# Patient Record
Sex: MALE | Race: WHITE | NOT HISPANIC OR LATINO | URBAN - METROPOLITAN AREA
[De-identification: names, ages, dates, MRNs, and addresses within clinical notes are randomized per-mention and may not be internally consistent; named-entity substitution may affect disease eponyms.]

---

## 2018-05-21 ENCOUNTER — OUTPATIENT (OUTPATIENT)
Dept: OUTPATIENT SERVICES | Facility: HOSPITAL | Age: 83
LOS: 1 days | End: 2018-05-21
Payer: COMMERCIAL

## 2018-05-21 DIAGNOSIS — H26.491 OTHER SECONDARY CATARACT, RIGHT EYE: ICD-10-CM

## 2018-05-21 PROCEDURE — 66821 AFTER CATARACT LASER SURGERY: CPT | Mod: RT

## 2019-07-26 PROBLEM — Z00.00 ENCOUNTER FOR PREVENTIVE HEALTH EXAMINATION: Status: ACTIVE | Noted: 2019-07-26

## 2019-07-29 ENCOUNTER — APPOINTMENT (OUTPATIENT)
Dept: SURGERY | Facility: CLINIC | Age: 84
End: 2019-07-29
Payer: MEDICARE

## 2019-07-29 VITALS
SYSTOLIC BLOOD PRESSURE: 168 MMHG | HEART RATE: 82 BPM | TEMPERATURE: 98.4 F | HEIGHT: 66.5 IN | DIASTOLIC BLOOD PRESSURE: 67 MMHG | WEIGHT: 142 LBS | BODY MASS INDEX: 22.55 KG/M2

## 2019-07-29 DIAGNOSIS — Z82.49 FAMILY HISTORY OF ISCHEMIC HEART DISEASE AND OTHER DISEASES OF THE CIRCULATORY SYSTEM: ICD-10-CM

## 2019-07-29 DIAGNOSIS — Z86.79 PERSONAL HISTORY OF OTHER DISEASES OF THE CIRCULATORY SYSTEM: ICD-10-CM

## 2019-07-29 DIAGNOSIS — L08.9 SEBACEOUS CYST: ICD-10-CM

## 2019-07-29 DIAGNOSIS — L72.3 SEBACEOUS CYST: ICD-10-CM

## 2019-07-29 DIAGNOSIS — Z78.9 OTHER SPECIFIED HEALTH STATUS: ICD-10-CM

## 2019-07-29 DIAGNOSIS — Z87.891 PERSONAL HISTORY OF NICOTINE DEPENDENCE: ICD-10-CM

## 2019-07-29 DIAGNOSIS — Z86.39 PERSONAL HISTORY OF OTHER ENDOCRINE, NUTRITIONAL AND METABOLIC DISEASE: ICD-10-CM

## 2019-07-29 DIAGNOSIS — Z85.46 PERSONAL HISTORY OF MALIGNANT NEOPLASM OF PROSTATE: ICD-10-CM

## 2019-07-29 PROCEDURE — 10061 I&D ABSCESS COMP/MULTIPLE: CPT

## 2019-07-29 PROCEDURE — 99202 OFFICE O/P NEW SF 15 MIN: CPT | Mod: 25

## 2019-07-29 RX ORDER — CHOLECALCIFEROL (VITAMIN D3) 25 MCG
TABLET ORAL
Refills: 0 | Status: ACTIVE | COMMUNITY

## 2019-07-29 RX ORDER — CRANBERRY FRUIT EXTRACT 650 MG
CAPSULE ORAL
Refills: 0 | Status: ACTIVE | COMMUNITY

## 2019-07-29 RX ORDER — GLUC/MSM/COLGN2/HYAL/ANTIARTH3 375-375-20
TABLET ORAL
Refills: 0 | Status: ACTIVE | COMMUNITY

## 2019-07-29 RX ORDER — QUINAPRIL HYDROCHLORIDE 5 MG/1
TABLET, FILM COATED ORAL
Refills: 0 | Status: ACTIVE | COMMUNITY

## 2019-07-29 RX ORDER — WARFARIN 4 MG/1
TABLET ORAL
Refills: 0 | Status: ACTIVE | COMMUNITY

## 2019-07-29 RX ORDER — PRASUGREL 5 MG/1
5 TABLET, FILM COATED ORAL
Refills: 0 | Status: ACTIVE | COMMUNITY

## 2019-07-29 RX ORDER — ATENOLOL 100 MG/1
100 TABLET ORAL
Refills: 0 | Status: ACTIVE | COMMUNITY

## 2019-07-29 RX ORDER — ATORVASTATIN CALCIUM 20 MG/1
20 TABLET, FILM COATED ORAL
Refills: 0 | Status: ACTIVE | COMMUNITY

## 2019-07-29 RX ORDER — PSYLLIUM HUSK 0.4 G
CAPSULE ORAL
Refills: 0 | Status: ACTIVE | COMMUNITY

## 2019-07-29 RX ORDER — CHLORTHALIDONE 50 MG/1
TABLET ORAL
Refills: 0 | Status: ACTIVE | COMMUNITY

## 2019-07-29 RX ORDER — ASCORBIC ACID 500 MG
TABLET ORAL
Refills: 0 | Status: ACTIVE | COMMUNITY

## 2019-07-31 NOTE — HISTORY OF PRESENT ILLNESS
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 He denies any fevers or chills. He states Pain when sitting against his back.

## 2019-07-31 NOTE — ASSESSMENT
[FreeTextEntry1] : 83 yo male with an infected sebaceous cyst which was I&D'd .\par \par wound packed. to remove packing tomorrow.\par \par \par f/u prn

## 2019-07-31 NOTE — PHYSICAL EXAM
[Respiratory Effort] : normal respiratory effort [Alert] : alert [Oriented to Person] : oriented to person [Oriented to Place] : oriented to place [Oriented to Time] : oriented to time [Calm] : calm [de-identified] : EOMI [de-identified] : KRISTIN GRIGSBY NAD [JVD] : no jugular venous distention  [de-identified] : + infected cyst of back, +tender and draining

## 2019-11-14 ENCOUNTER — APPOINTMENT (OUTPATIENT)
Dept: PLASTIC SURGERY | Facility: CLINIC | Age: 84
End: 2019-11-14
Payer: MEDICARE

## 2019-11-14 VITALS
BODY MASS INDEX: 22.87 KG/M2 | RESPIRATION RATE: 18 BRPM | OXYGEN SATURATION: 99 % | SYSTOLIC BLOOD PRESSURE: 154 MMHG | TEMPERATURE: 98 F | HEIGHT: 66.5 IN | HEART RATE: 61 BPM | WEIGHT: 144 LBS | DIASTOLIC BLOOD PRESSURE: 63 MMHG

## 2019-11-14 PROCEDURE — 99203 OFFICE O/P NEW LOW 30 MIN: CPT

## 2019-11-14 NOTE — PHYSICAL EXAM
[de-identified] : Left cheek brown pigmented lesion, irregular borders, non-ulcerated, 2cm x 1.5 cm. [NI] : Normal

## 2019-11-14 NOTE — HISTORY OF PRESENT ILLNESS
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  The patient is pre-op for left cheek excision by Dr. Dennis and he is here to plan for reconstruction.  He is a non-smoker.  He has no personal history of blood clots.  He is on coumadin for a mechanical valve, and he had coronary stents placed last year.

## 2020-01-10 RX ORDER — OXYCODONE 5 MG/1
5 TABLET ORAL EVERY 6 HOURS
Qty: 10 | Refills: 0 | Status: ACTIVE | COMMUNITY
Start: 2020-01-10 | End: 1900-01-01

## 2020-01-28 ENCOUNTER — APPOINTMENT (OUTPATIENT)
Dept: PLASTIC SURGERY | Facility: CLINIC | Age: 85
End: 2020-01-28
Payer: MEDICARE

## 2020-01-28 VITALS
RESPIRATION RATE: 18 BRPM | SYSTOLIC BLOOD PRESSURE: 183 MMHG | DIASTOLIC BLOOD PRESSURE: 73 MMHG | HEART RATE: 46 BPM | OXYGEN SATURATION: 100 %

## 2020-01-28 PROCEDURE — 99024 POSTOP FOLLOW-UP VISIT: CPT

## 2020-01-28 NOTE — HISTORY OF PRESENT ILLNESS
[FreeTextEntry1] : Mr. KELLIE MARTE is a 85 year old male with past medical history of left cheek melanoma who presents now s/p closure of Mohs defect 1/17/20. Pt doing well\par States he is taking a blood thinner and noticed some bruising on his neck and chest  \par denies fever, chills, pain, redness, swelling

## 2020-01-28 NOTE — ASSESSMENT
[FreeTextEntry1] : 84 yo male s/p Mohs closure right cheek\par steri strips removed today as they were falling off \par discussed to shower like normal\par monitor for increased swelling, bleeding, pain, fever, chills, redness, drainage

## 2020-01-28 NOTE — PHYSICAL EXAM
[NI] : Normal [de-identified] : healing ecchymosis to neck, no fluid collections or signs of expanding hematoma  [de-identified] : left cheek incision c/d/i\par no palpable fluid collections or signs of infection \par healing ecchymosis from left neck to left upper chest

## 2020-02-04 ENCOUNTER — APPOINTMENT (OUTPATIENT)
Dept: PLASTIC SURGERY | Facility: CLINIC | Age: 85
End: 2020-02-04
Payer: MEDICARE

## 2020-02-04 VITALS
TEMPERATURE: 97.3 F | SYSTOLIC BLOOD PRESSURE: 151 MMHG | DIASTOLIC BLOOD PRESSURE: 78 MMHG | RESPIRATION RATE: 16 BRPM | OXYGEN SATURATION: 98 % | BODY MASS INDEX: 23.51 KG/M2 | WEIGHT: 148.03 LBS | HEIGHT: 66.5 IN | HEART RATE: 63 BPM

## 2020-02-04 VITALS — SYSTOLIC BLOOD PRESSURE: 156 MMHG | DIASTOLIC BLOOD PRESSURE: 69 MMHG

## 2020-02-04 PROCEDURE — 99024 POSTOP FOLLOW-UP VISIT: CPT

## 2020-02-04 NOTE — PHYSICAL EXAM
[NI] : Normal [de-identified] : left cheek incision c/d/i\par no palpable fluid collections or signs of infection

## 2020-02-04 NOTE — ASSESSMENT
[FreeTextEntry1] : 86 yo male s/p Mohs closure right cheek with rhomboid flap 1/17/20\par pt doing well \par flap healing nicely\par discussed applying sunscreen to area daily as well as daily massages to begin in 2 weeks\par monitor for redness, fever, chills, drainage

## 2020-02-04 NOTE — HISTORY OF PRESENT ILLNESS
[FreeTextEntry1] : Mr. KELLIE MARTE is a 85 year old male with past medical history of left cheek melanoma who presents now s/p closure of Mohs defect 1/17/20. Pt doing well\par states the bruising has improved and is now gone \par denies fever, chills, pain, redness, swelling

## 2020-06-10 ENCOUNTER — APPOINTMENT (OUTPATIENT)
Dept: SURGERY | Facility: CLINIC | Age: 85
End: 2020-06-10
Payer: MEDICARE

## 2020-06-10 VITALS
TEMPERATURE: 98 F | DIASTOLIC BLOOD PRESSURE: 78 MMHG | BODY MASS INDEX: 23.03 KG/M2 | WEIGHT: 145 LBS | HEART RATE: 50 BPM | OXYGEN SATURATION: 97 % | HEIGHT: 66.5 IN | SYSTOLIC BLOOD PRESSURE: 170 MMHG

## 2020-06-10 DIAGNOSIS — Z87.898 PERSONAL HISTORY OF OTHER SPECIFIED CONDITIONS: ICD-10-CM

## 2020-06-10 PROCEDURE — 99212 OFFICE O/P EST SF 10 MIN: CPT

## 2020-07-09 RX ORDER — OXYCODONE 5 MG/1
5 TABLET ORAL
Qty: 10 | Refills: 0 | Status: ACTIVE | COMMUNITY
Start: 2020-07-09 | End: 1900-01-01

## 2020-07-17 ENCOUNTER — APPOINTMENT (OUTPATIENT)
Dept: PLASTIC SURGERY | Facility: AMBULATORY SURGERY CENTER | Age: 85
End: 2020-07-17

## 2020-09-09 RX ORDER — OXYCODONE 5 MG/1
5 TABLET ORAL
Qty: 10 | Refills: 0 | Status: ACTIVE | COMMUNITY
Start: 2020-09-09 | End: 1900-01-01

## 2020-09-18 ENCOUNTER — APPOINTMENT (OUTPATIENT)
Dept: PLASTIC SURGERY | Facility: AMBULATORY SURGERY CENTER | Age: 85
End: 2020-09-18
Payer: MEDICARE

## 2020-09-18 PROCEDURE — 14060 TIS TRNFR E/N/E/L 10 SQ CM/<: CPT

## 2020-09-24 ENCOUNTER — APPOINTMENT (OUTPATIENT)
Dept: PLASTIC SURGERY | Facility: CLINIC | Age: 85
End: 2020-09-24
Payer: MEDICARE

## 2020-09-24 VITALS
OXYGEN SATURATION: 96 % | SYSTOLIC BLOOD PRESSURE: 157 MMHG | TEMPERATURE: 98.1 F | HEART RATE: 66 BPM | WEIGHT: 145 LBS | BODY MASS INDEX: 23.03 KG/M2 | DIASTOLIC BLOOD PRESSURE: 88 MMHG | HEIGHT: 66.5 IN

## 2020-09-24 PROCEDURE — 99024 POSTOP FOLLOW-UP VISIT: CPT

## 2020-10-08 ENCOUNTER — APPOINTMENT (OUTPATIENT)
Dept: PLASTIC SURGERY | Facility: CLINIC | Age: 85
End: 2020-10-08
Payer: MEDICARE

## 2020-10-08 VITALS
DIASTOLIC BLOOD PRESSURE: 79 MMHG | WEIGHT: 145 LBS | TEMPERATURE: 97.6 F | SYSTOLIC BLOOD PRESSURE: 168 MMHG | HEIGHT: 66.5 IN | HEART RATE: 61 BPM | OXYGEN SATURATION: 95 % | BODY MASS INDEX: 23.03 KG/M2

## 2020-10-08 PROCEDURE — 99024 POSTOP FOLLOW-UP VISIT: CPT

## 2020-10-08 NOTE — PHYSICAL EXAM
[NI] : Normal [de-identified] : right helix with incisional dehiscence, fibrin at base, no surrounding signs of infection \par no drainage of odor

## 2020-10-08 NOTE — REASON FOR VISIT
[Post Op: _________] : a [unfilled] post op visit [FreeTextEntry1] : DOS: 09/18/2020 s/p closure of MOHS of right helix with full thickness skin graft and local tissue rearrangement. Pt states he is doing better with time.

## 2020-10-08 NOTE — ASSESSMENT
[FreeTextEntry1] : 86 yo male s/p right helix Mohs closure with local tissue rearrangement 9/18/2020\par pt doing well\par seen and examined with Dr Alston\par discussed daily dressing changes with moist to dry dressings daily after showers\par monitor for redness, swelling, fever, chills\par all pt questions answered\par

## 2020-10-08 NOTE — HISTORY OF PRESENT ILLNESS
[FreeTextEntry1] : Mr. KELLIE MARTE is a 85 year old male with past medical history of right ear helix melanoma who presents now s/p right helix Mohs closure with local tissue rearrangement 9/18/2020\par pt doing ok \par pt has been doing daily dressing changes as instructed \par denies fever, chills \par \par

## 2020-10-12 NOTE — REASON FOR VISIT
[Post Op: _________] : a [unfilled] post op visit [Spouse] : spouse [FreeTextEntry1] : DOS: 09/18/2020 s/p closure of MOHS of right helix with full thickness skin graft and local tissue rearrangement. Pt states he is doing better with time.

## 2020-10-22 ENCOUNTER — APPOINTMENT (OUTPATIENT)
Dept: PLASTIC SURGERY | Facility: CLINIC | Age: 85
End: 2020-10-22
Payer: MEDICARE

## 2020-10-22 VITALS
OXYGEN SATURATION: 96 % | TEMPERATURE: 98.3 F | HEIGHT: 66.5 IN | HEART RATE: 65 BPM | BODY MASS INDEX: 23.03 KG/M2 | DIASTOLIC BLOOD PRESSURE: 95 MMHG | WEIGHT: 145 LBS | SYSTOLIC BLOOD PRESSURE: 185 MMHG

## 2020-10-22 DIAGNOSIS — S01.309S: ICD-10-CM

## 2020-10-22 PROCEDURE — 99024 POSTOP FOLLOW-UP VISIT: CPT

## 2020-11-03 ENCOUNTER — APPOINTMENT (OUTPATIENT)
Dept: PLASTIC SURGERY | Facility: CLINIC | Age: 85
End: 2020-11-03
Payer: MEDICARE

## 2020-11-03 VITALS
TEMPERATURE: 97.3 F | OXYGEN SATURATION: 95 % | WEIGHT: 145 LBS | BODY MASS INDEX: 23.3 KG/M2 | DIASTOLIC BLOOD PRESSURE: 68 MMHG | HEART RATE: 60 BPM | SYSTOLIC BLOOD PRESSURE: 168 MMHG | HEIGHT: 66 IN

## 2020-11-03 DIAGNOSIS — C43.30 MALIGNANT MELANOMA OF UNSPECIFIED PART OF FACE: ICD-10-CM

## 2020-11-03 PROCEDURE — 99024 POSTOP FOLLOW-UP VISIT: CPT

## 2020-11-03 NOTE — ASSESSMENT
[FreeTextEntry1] : 84 yo male s/p right helix Mohs closure 9/18/2020\par pt doing well\par wound healed\par discussed placing a band aid on area after showers

## 2020-11-03 NOTE — PHYSICAL EXAM
[NI] : Normal [de-identified] : right helix incisional wound now healed\par no drainage of odor \par no signs of infection

## 2020-11-19 ENCOUNTER — APPOINTMENT (OUTPATIENT)
Dept: PLASTIC SURGERY | Facility: CLINIC | Age: 85
End: 2020-11-19
Payer: MEDICARE

## 2020-11-19 VITALS
HEIGHT: 66 IN | SYSTOLIC BLOOD PRESSURE: 202 MMHG | BODY MASS INDEX: 23.3 KG/M2 | TEMPERATURE: 97.4 F | WEIGHT: 145 LBS | OXYGEN SATURATION: 95 % | DIASTOLIC BLOOD PRESSURE: 97 MMHG | HEART RATE: 63 BPM

## 2020-11-19 PROCEDURE — 99024 POSTOP FOLLOW-UP VISIT: CPT

## 2020-11-19 NOTE — HISTORY OF PRESENT ILLNESS
[FreeTextEntry1] : Mr. KELLIE MARTE is a 85 year old male with past medical history of right ear helix melanoma who presents now s/p right helix Mohs closure with local tissue rearrangement 9/18/2020\par pt doing ok \par pt has been applying bacitracin\par denies fever, chills \par \par

## 2020-11-19 NOTE — PHYSICAL EXAM
[NI] : Normal [de-identified] : right helix incisional wound now healed\par no drainage of odor \par no signs of infection

## 2020-12-13 PROBLEM — S01.309S: Status: ACTIVE | Noted: 2020-10-12

## 2020-12-13 NOTE — REASON FOR VISIT
[Follow-Up: _____] : a [unfilled] follow-up visit [FreeTextEntry1] : Jonny ear reconstruction with primary closure on 09/18/2020

## 2020-12-13 NOTE — HISTORY OF PRESENT ILLNESS
[FreeTextEntry1] : Mr. Horton is a 85 year old female here today for follow up visit. She is s/p Right ear reconstruction with primary closure on 09/18/2020. He has a past medical history significant for right ear helix melanoma, High Cholesterol, Hypertension and Malignant Neoplasm of prostate.

## 2021-08-06 NOTE — ASSESSMENT
[FreeTextEntry1] : 84 yo male s/p right helix Mohs closure 9/18/2020\par pt seen and examined with Dr Alston\par wound has now healed\par cartilage has skin coverage \par monitor for redness, swelling, fever, chills\par all pt questions answered\par 
chest pressure